# Patient Record
(demographics unavailable — no encounter records)

---

## 2025-01-31 NOTE — CONSULT LETTER
[Dear  ___] : Dear  [unfilled], [Please see my note below.] : Please see my note below. [FreeTextEntry1] : I had the pleasure of seeing YAA RODRIGUEZ in my office on Jan 31, 2025 Thank you very much for letting me participate in YAA RODRIGUEZ 's care and I will keep you informed of his progress. Sincerely, Rajat Mendenhall M.D.

## 2025-01-31 NOTE — PHYSICAL EXAM
[Acute Distress] : no acute distress [Alert] : alert [Toxic appearing] : well appearing [Normocephalic] : normocephalic [Icteric sclera] : no icteric sclera [FROM] : full range of motion [CTAB] : clear to auscultation bilaterally [Normal Respiratory Efforts] : normal respiratory efforts [Wheezing] : no wheezing [Regular heart rate and rhythm] : regular heart rate and rhythm [Normal S1, S2 audible] : normal s1, s2 audible [Murmurs] : no murmurs [Moves all extremities x4] : moves all extremities x4 [Warm, well perfused x4] : warm, well perfused x4 [Capillary refill < 2s] : Capillary refill < 2s [NL] : grossly intact [Grossly intact] : grossly intact [Rash] : no rash [Jaundice] : no jaundice [TextBox_37] : Soft, non-tender, non-distended, with positive bowel sounds.  There are no masses and no organomegaly.  Right inguinal hernia easily reducible, testicle down, no incarceration. No left sided defect.testicle down.

## 2025-01-31 NOTE — REASON FOR VISIT
[Initial - Scheduled] : an initial, scheduled visit with concerns of [Father] : father [FreeTextEntry3] : right inguinal Hernia.

## 2025-01-31 NOTE — HISTORY OF PRESENT ILLNESS
[FreeTextEntry1] : Fawad العلي is a 23-month-old male with a cc/ right inguinal bulge x 4 months.  Last happened last weekend and went to ED where an US c/w a hernia.  Bulge disappeared on its own and pt now comes in for an evaluation.  No history of incarceration.  Pt is otherwise asymptomatic.

## 2025-01-31 NOTE — REVIEW OF SYSTEMS
[Negative] : Genitourinary [FreeTextEntry1] : immunization was up to date, no hospitalizations, no surgeries.

## 2025-01-31 NOTE — ASSESSMENT
[FreeTextEntry1] : Overall, Fawad is a 23 month old male with a cc/ of an intermittent right inguinal bulge c/w a right inguinal hernia.  He will be scheduled for a right inguinal hernia repair in same day surgery in the near future.